# Patient Record
Sex: FEMALE | Race: WHITE | NOT HISPANIC OR LATINO | Employment: OTHER
[De-identification: names, ages, dates, MRNs, and addresses within clinical notes are randomized per-mention and may not be internally consistent; named-entity substitution may affect disease eponyms.]

---

## 2017-05-26 ENCOUNTER — HEALTH MAINTENANCE LETTER (OUTPATIENT)
Age: 57
End: 2017-05-26

## 2018-12-27 ENCOUNTER — THERAPY VISIT (OUTPATIENT)
Dept: PHYSICAL THERAPY | Facility: CLINIC | Age: 58
End: 2018-12-27
Payer: COMMERCIAL

## 2018-12-27 DIAGNOSIS — M25.552 BILATERAL HIP PAIN: ICD-10-CM

## 2018-12-27 DIAGNOSIS — M25.551 BILATERAL HIP PAIN: ICD-10-CM

## 2018-12-27 PROCEDURE — 97161 PT EVAL LOW COMPLEX 20 MIN: CPT | Mod: GP | Performed by: PHYSICAL THERAPIST

## 2018-12-27 PROCEDURE — 97110 THERAPEUTIC EXERCISES: CPT | Mod: GP | Performed by: PHYSICAL THERAPIST

## 2018-12-27 ASSESSMENT — ACTIVITIES OF DAILY LIVING (ADL)
LIGHT_TO_MODERATE_WORK: MODERATE DIFFICULTY
WALKING_INITIALLY: EXTREME DIFFICULTY
GETTING_INTO_AND_OUT_OF_A_BATHTUB: EXTREME DIFFICULTY
SITTING_FOR_15_MINUTES: MODERATE DIFFICULTY
WALKING_DOWN_STEEP_HILLS: MODERATE DIFFICULTY
STANDING_FOR_15_MINUTES: SLIGHT DIFFICULTY
GETTING_INTO_AND_OUT_OF_AN_AVERAGE_CAR: MODERATE DIFFICULTY
WALKING_UP_STEEP_HILLS: MODERATE DIFFICULTY
RECREATIONAL_ACTIVITIES: MODERATE DIFFICULTY
HOS_ADL_ITEM_SCORE_TOTAL: 32
WALKING_15_MINUTES_OR_GREATER: MODERATE DIFFICULTY
HOS_ADL_COUNT: 17
GOING_DOWN_1_FLIGHT_OF_STAIRS: MODERATE DIFFICULTY
ROLLING_OVER_IN_BED: EXTREME DIFFICULTY
STEPPING_UP_AND_DOWN_CURBS: MODERATE DIFFICULTY
HOS_ADL_HIGHEST_POTENTIAL_SCORE: 68
TWISTING/PIVOTING_ON_INVOLVED_LEG: SLIGHT DIFFICULTY
GOING_UP_1_FLIGHT_OF_STAIRS: MODERATE DIFFICULTY
WALKING_APPROXIMATELY_10_MINUTES: MODERATE DIFFICULTY
PUTTING_ON_SOCKS_AND_SHOES: MODERATE DIFFICULTY
HEAVY_WORK: MODERATE DIFFICULTY
HOW_WOULD_YOU_RATE_YOUR_CURRENT_LEVEL_OF_FUNCTION_DURING_YOUR_USUAL_ACTIVITIES_OF_DAILY_LIVING_FROM_0_TO_100_WITH_100_BEING_YOUR_LEVEL_OF_FUNCTION_PRIOR_TO_YOUR_HIP_PROBLEM_AND_0_BEING_THE_INABILITY_TO_PERFORM_ANY_OF_YOUR_USUAL_DAILY_ACTIVITIES?: 80
HOS_ADL_SCORE(%): 47.06
DEEP_SQUATTING: EXTREME DIFFICULTY

## 2018-12-27 NOTE — PROGRESS NOTES
[facility] Initial Evaluation  Subjective:  The history is provided by the patient. No  was used.   Elizabeth Sen is a 58 year old female with a bilateral hips condition.  Condition occurred with:  Repetition/overuse.  Condition occurred: at home.  This is a chronic condition  August 2018 started having pain in B hips, R>L. Gradually progressed to radicular sx down R LE..    Patient reports pain:  Posterior (worst in piriformis).  Radiates to:  Gluteals, thigh, knee, lower leg and foot.  Pain is described as cramping, sharp, shooting and aching and is constant and reported as 2/10 and 8/10 (2-8/10 depending on activity level).  Associated symptoms:  Tingling, numbness, loss of motion/stiffness and loss of strength. Pain is worse in the P.M. (possibly due to being in sleeping position).  Symptoms are exacerbated by certain positions, lying on extremity, ascending stairs, bending/squatting and transfers and relieved by heat.  Since onset symptoms are gradually worsening.  Special testing: none yet.  Previous treatment: none yet.    General health as reported by patient is good.  Pertinent medical history includes:  Numbness/tingling (change in bowel/bladder- urgency).  Medical allergies: yes.  Other surgeries include:  None reported.  Current medications:  None as reported by the patient.  Current occupation is Farmer's Market Coordinator; Lives at home w/ ; Intermittently cares from 2 grandsons (ages 11 and 15); Has small dog to care for.  Patient is working in normal job without restrictions.  Primary job tasks include:  Prolonged sitting, prolonged standing, driving, repetitive tasks and lifting (computer work).    Barriers include:  Bathroom/bedroom on second floor and stairs.    Red flags:  None as reported by the patient.                        Objective:        Flexibility/Screens:       Lower Extremity:  Decreased left lower extremity  flexibility:Piriformis    Decreased right lower extremity flexibility:  Piriformis               Lumbar/SI Evaluation  ROM:    AROM Lumbar:   Flexion:            To toes ++pull in B LE  Ext:                    75% limited w/ compensation by knee flexion ++pain   Side Bend:        Left:  To mid thigh +pain R glute, L SI    Right:  To knee ++pain L low back  Rotation:           Left:  50% limitation w/ compensation through B LE    Right:  50% limitation w/ compensation through B LE  Side [place]:        Left:     Right:                     Lumbar Palpation:    Tenderness present at Left:    Piriformis and Greater Trochanter  Tenderness present at Right: Piriformis and Greater Trochanter        SI joint/Sacrum:    (?) CARLOS B- Min ROM B + pain lateral hip   (+) Active SLR (L 4/5, R 3+/5; w/ cuing to pelvic tilt first: L 5/5, R 4+/5)                                                       General     ROS    Assessment/Plan:    Patient is a 58 year old female with both sides hip complaints.    Patient has the following significant findings with corresponding treatment plan.                Diagnosis 1:  B Hip pain consistent w/ piriformis syndrome and possible greater trochanteric bursitis  Pain -  hot/cold therapy, US, manual therapy, self management, education and home program  Decreased ROM/flexibility - manual therapy, therapeutic exercise, therapeutic activity and home program  Decreased strength - therapeutic exercise, therapeutic activities and home program  Impaired muscle performance - neuro re-education and home program  Decreased function - therapeutic activities and home program  Impaired posture - neuro re-education, therapeutic activities and home program    Therapy Evaluation Codes:   1) History comprised of:   Personal factors that impact the plan of care:      Age and Time since onset of symptoms.    Comorbidity factors that impact the plan of care are:      Bowel/bladder changes, Numbness/tingling and Pain  at night/rest.     Medications impacting care: None.  2) Examination of Body Systems comprised of:   Body structures and functions that impact the plan of care:      Hip and Pelvis.   Activity limitations that impact the plan of care are:      Bathing, Cooking, Driving, Dressing, Jumping, Lifting, Sitting, Sports, Squatting/kneeling, Stairs, Standing, Walking, Working, Sleeping and Laying down.  3) Clinical presentation characteristics are:   Stable/Uncomplicated.  4) Decision-Making    Low complexity using standardized patient assessment instrument and/or measureable assessment of functional outcome.  Cumulative Therapy Evaluation is: Low complexity.    Previous and current functional limitations:  (See Goal Flow Sheet for this information)    Short term and Long term goals: (See Goal Flow Sheet for this information)     Communication ability:  Patient appears to be able to clearly communicate and understand verbal and written communication and follow directions correctly.  Treatment Explanation - The following has been discussed with the patient:   RX ordered/plan of care  Anticipated outcomes  Possible risks and side effects  This patient would benefit from PT intervention to resume normal activities.   Rehab potential is good.    Frequency:  1 X week, once daily  Duration:  for 6 weeks  Discharge Plan:  Achieve all LTG.  Independent in home treatment program.  Reach maximal therapeutic benefit.    Please refer to the daily flowsheet for treatment today, total treatment time and time spent performing 1:1 timed codes.

## 2018-12-27 NOTE — LETTER
[facility] PHYSICAL [facility]  [address]  [facility] [id number] [phone]    2018    Re: Elizabeth Sen   :   1960  MRN:  [id number]   REFERRING PHYSICIAN:   Shira Garcia    [facility]    Date of Initial Evaluation:  18  Visits:  Rxs Used: 1  Reason for Referral:  Bilateral hip pain    EVALUATION SUMMARY     [facility]  Subjective:  The history is provided by the patient. No  was used.   Elizabeth Sen is a 58 year old female with a bilateral hips condition.  Condition occurred with:  Repetition/overuse.  Condition occurred: at home.  This is a chronic condition  2018 started having pain in B hips, R>L. Gradually progressed to radicular sx down R LE..    Patient reports pain:  Posterior (worst in piriformis).  Radiates to:  Gluteals, thigh, knee, lower leg and foot.  Pain is described as cramping, sharp, shooting and aching and is constant and reported as 2/10 and 8/10 (2-8/10 depending on activity level).  Associated symptoms:  Tingling, numbness, loss of motion/stiffness and loss of strength. Pain is worse in the P.M. (possibly due to being in sleeping position).  Symptoms are exacerbated by certain positions, lying on extremity, ascending stairs, bending/squatting and transfers and relieved by heat.  Since onset symptoms are gradually worsening.  Special testing: none yet.  Previous treatment: none yet.    General health as reported by patient is good.  Pertinent medical history includes:  Numbness/tingling (change in bowel/bladder- urgency).  Medical allergies: yes.  Other surgeries include:  None reported.  Current medications:  None as reported by the patient.  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  Patient is working in normal job without restrictions.  Primary job tasks include:  Prolonged sitting, prolonged standing, driving, repetitive tasks and lifting (computer work).    Barriers include:  Bathroom/bedroom on second floor and stairs.    Red flags:  None as reported by the patient.    Objective:    Flexibility/Screens:   Lower Extremity:  Decreased left lower extremity flexibility:Piriformis  Decreased right lower extremity flexibility:  Piriformis      Lumbar/SI Evaluation  ROM:    AROM Lumbar:   Flexion:            To toes ++pull in B LE  Ext:                    75% limited w/ compensation by knee flexion ++pain   Side Bend:        Left:  To mid thigh +pain R glute, L SI    Right:  To knee ++pain L low back  Rotation:           Left:  50% limitation w/ compensation through B LE    Right:  50% limitation w/ compensation through B LE  Side [place]:        Left:     Right:         Lumbar Palpation:    Tenderness present at Left:    Piriformis and Greater Trochanter  Tenderness present at Right: Piriformis and Greater Trochanter    SI joint/Sacrum:    (?) CARLOS B- Min ROM B + pain lateral hip   (+) Active SLR (L 4/5, R 3+/5; w/ cuing to pelvic tilt first: L 5/5, R 4+/5)    Assessment/Plan:    Patient is a 58 year old female with both sides hip complaints.    Patient has the following significant findings with corresponding treatment plan.                Diagnosis 1:  B Hip pain consistent w/ piriformis syndrome and possible greater trochanteric bursitis  Pain -  hot/cold therapy, US, manual therapy, self management, education and home program  Decreased ROM/flexibility - manual therapy, therapeutic exercise, therapeutic activity and home program  Decreased strength - therapeutic exercise, therapeutic activities and home program  Impaired muscle performance - neuro re-education and home program  Decreased function - therapeutic activities and home program  Impaired posture - neuro re-education,  therapeutic activities and home program    Therapy Evaluation Codes:   1) History comprised of:   Personal factors that impact the plan of care:      Age and Time since onset of symptoms.    Comorbidity factors that impact the plan of care are:      Bowel/bladder changes, Numbness/tingling and Pain at night/rest.     Medications impacting care: None.  2) Examination of Body Systems comprised of:   Body structures and functions that impact the plan of care:      Hip and Pelvis.   Activity limitations that impact the plan of care are:      Bathing, Cooking, Driving, Dressing, Jumping, Lifting, Sitting, Sports, Squatting/kneeling, Stairs, Standing, Walking, Working, Sleeping and Laying down.  3) Clinical presentation characteristics are:   Stable/Uncomplicated.  4) Decision-Making    Low complexity using standardized patient assessment instrument and/or measureable assessment of functional outcome.  Cumulative Therapy Evaluation is: Low complexity.      Previous and current functional limitations:  (See Goal Flow Sheet for this information)    Short term and Long term goals: (See Goal Flow Sheet for this information)     Communication ability:  Patient appears to be able to clearly communicate and understand verbal and written communication and follow directions correctly.  Treatment Explanation - The following has been discussed with the patient:   RX ordered/plan of care  Anticipated outcomes  Possible risks and side effects  This patient would benefit from PT intervention to resume normal activities.   Rehab potential is good.    Frequency:  1 X week, once daily  Duration:  for 6 weeks  Discharge Plan:  Achieve all LTG.  Independent in home treatment program.  Reach maximal therapeutic benefit.        Thank you for your referral.    INQUIRIES  Therapist: Amanda Hilligoss DPLAINA  [facility]  [address]  [facility] [address]  Phone: [phone]  Fax:  [phone]

## 2018-12-28 PROBLEM — M25.551 BILATERAL HIP PAIN: Status: ACTIVE | Noted: 2018-12-28

## 2018-12-28 PROBLEM — M25.552 BILATERAL HIP PAIN: Status: ACTIVE | Noted: 2018-12-28

## 2019-07-03 PROBLEM — M25.551 BILATERAL HIP PAIN: Status: RESOLVED | Noted: 2018-12-28 | Resolved: 2019-07-03

## 2019-07-03 PROBLEM — M25.552 BILATERAL HIP PAIN: Status: RESOLVED | Noted: 2018-12-28 | Resolved: 2019-07-03

## 2019-12-08 ENCOUNTER — HEALTH MAINTENANCE LETTER (OUTPATIENT)
Age: 59
End: 2019-12-08

## 2020-03-15 ENCOUNTER — HEALTH MAINTENANCE LETTER (OUTPATIENT)
Age: 60
End: 2020-03-15